# Patient Record
Sex: FEMALE | Employment: UNEMPLOYED | URBAN - METROPOLITAN AREA
[De-identification: names, ages, dates, MRNs, and addresses within clinical notes are randomized per-mention and may not be internally consistent; named-entity substitution may affect disease eponyms.]

---

## 2020-08-31 ENCOUNTER — INITIAL CONSULT (OUTPATIENT)
Dept: SURGERY | Age: 55
End: 2020-08-31
Payer: MEDICAID

## 2020-08-31 VITALS
WEIGHT: 143 LBS | HEIGHT: 59 IN | BODY MASS INDEX: 28.83 KG/M2 | DIASTOLIC BLOOD PRESSURE: 76 MMHG | SYSTOLIC BLOOD PRESSURE: 112 MMHG

## 2020-08-31 PROCEDURE — 99203 OFFICE O/P NEW LOW 30 MIN: CPT | Performed by: STUDENT IN AN ORGANIZED HEALTH CARE EDUCATION/TRAINING PROGRAM

## 2020-08-31 RX ORDER — M-VIT,TX,IRON,MINS/CALC/FOLIC 27MG-0.4MG
1 TABLET ORAL DAILY
COMMUNITY

## 2020-08-31 ASSESSMENT — ENCOUNTER SYMPTOMS
NAUSEA: 0
ABDOMINAL DISTENTION: 0
VOMITING: 0
SHORTNESS OF BREATH: 0
EYES NEGATIVE: 1
CHEST TIGHTNESS: 0
BACK PAIN: 0
ABDOMINAL PAIN: 0
COUGH: 0

## 2020-08-31 NOTE — PROGRESS NOTES
[facility] ([facility]) Vascular and Endovascular Surgery  Luis Parrish DO, RPVI      Chief Complaint:   Chief Complaint   Patient presents with   [facility] Surgical Consult     referred by dr. Ora Cha for varicose veins, having issues with her legs, normally on her feet about 10 hours a day. mother has issues with varicose veins. also has issues with the tendons and is having surgery on her right hand sept 2nd      : #[id number]    HPI  Renny Pinto is a 63 y.o. female who is being seen for bilateral lower extremity swelling. The patient states that she works in a [employer] and has significant swelling at the end of the day. She is Pakistani-speaking only. She states that she has noticed some increased varicose veins particularly in her left leg along the left medial aspect of her calf. She does not really endorse heaviness or ache but she does have some pain after the end of a long day. She denies any wounds or tissue loss. She is a non-smoker. She does not describe any symptoms that would be suggestive of arterial insufficiency. He does state that she has a family history of varicose veins including her mother who had have a procedure which she does not recall the name to treat her varicose veins. In terms of her discomfort her left leg is worse than her right side. She has not tried compression at this time yet. She is open to any and all therapies as necessary. PMH  No past medical history on file. PSH  No past surgical history on file. Med  Current Outpatient Medications   Medication Sig Dispense Refill    Multiple Vitamins-Minerals (THERAPEUTIC MULTIVITAMIN-MINERALS) tablet Take 1 tablet by mouth daily       No current facility-administered medications for this visit.         Allergies  No Known Allergies    Social History  Social History     Socioeconomic History    Marital status: Unknown     Spouse name: None    Number of children: None    Years of education: None    Highest education level: Pupils are equal, round, and reactive to light. Neck:      Musculoskeletal: Normal range of motion. Cardiovascular:      Rate and Rhythm: Normal rate and regular rhythm. Pulmonary:      Effort: Pulmonary effort is normal. No respiratory distress. Breath sounds: No wheezing. Abdominal:      General: There is no distension. Tenderness: There is no abdominal tenderness. Musculoskeletal: Normal range of motion. General: No swelling. Right lower leg: Edema (1+) present. Left lower leg: Edema (1+) present. Skin:     General: Skin is warm and dry. Capillary Refill: Capillary refill takes less than 2 seconds. Comments: Varicosities prominent over distal, medial thigh     Neurological:      General: No focal deficit present. Mental Status: She is alert. Cranial Nerves: No cranial nerve deficit. Psychiatric:         Mood and Affect: Mood normal.         Behavior: Behavior normal.         Thought Content: Thought content normal.         Judgment: Judgment normal.        Vascular: 2+ radial, 2+ dorsalis pedis, 2+ posterial tibial and 2+ femoral    Vitals  Vitals:    08/31/20 1415   BP: 112/76   Site: Left Upper Arm   Position: Sitting   Cuff Size: Medium Adult   Weight: 143 lb (64.9 kg)   Height: 4' 11.06\" (1.5 m)       Imaging  None    Assessment  1. Venous insufficiency of both lower extremities  Bilateral reflux testing  Compression stockings      Reinaldo Oneal is a 63 y.o. female who is being seen for bilateral lower extremity swelling. The patient has a job that is consistent with significant venous insufficiency. She does stand all day. She endorses a family history of venous insufficiency requiring intervention. She has obvious varicose veins particularly on her left medial inner thigh and calf. She has evidence of reticular veins and pledge ectasias over both lower extremities.   Her swelling at this time is mild at best.  However it seems to get worse after standing for long periods of time. As stated above she does work in a [employer] which is consistent with possibly having significant dependent edema. Given that she has not yet tried any therapeutic modalities I will recommend initiation with compression. I do think it is necessary to evaluate her from a reflux standpoint. If she does have significant superficial venous reflux it is possible that ablation may benefit her greatly. However if she also does have significant deep axial reflux particular on the left side then we may be dealing with a may Thurner phenomenon as well. Be important to obtain this information to guide us down over our therapeutic pathway. However once again I am inclined to treat her conservatively with compression stockings. I have given her prescription and will start with 15 to 20 mmHg compression.     Archana Sexton DO, [address] [facility] Vascular and Endovascular Surgery

## 2020-09-03 ENCOUNTER — PROCEDURE VISIT (OUTPATIENT)
Dept: SURGERY | Age: 55
End: 2020-09-03
Payer: MEDICAID

## 2020-09-10 ENCOUNTER — OFFICE VISIT (OUTPATIENT)
Dept: SURGERY | Age: 55
End: 2020-09-10
Payer: MEDICAID

## 2020-09-10 ENCOUNTER — HOSPITAL ENCOUNTER (OUTPATIENT)
Age: 55
Discharge: HOME | End: 2020-09-10
Payer: MEDICAID

## 2020-09-10 ENCOUNTER — HOSPITAL ENCOUNTER (OUTPATIENT)
Dept: GENERAL RADIOLOGY | Age: 55
Discharge: HOME | End: 2020-09-10
Payer: MEDICAID

## 2020-09-10 VITALS
HEIGHT: 59 IN | WEIGHT: 143 LBS | SYSTOLIC BLOOD PRESSURE: 106 MMHG | DIASTOLIC BLOOD PRESSURE: 69 MMHG | BODY MASS INDEX: 28.83 KG/M2

## 2020-09-10 PROCEDURE — 71045 X-RAY EXAM CHEST 1 VIEW: CPT

## 2020-09-10 PROCEDURE — 93005 ELECTROCARDIOGRAM TRACING: CPT

## 2020-09-10 PROCEDURE — 99213 OFFICE O/P EST LOW 20 MIN: CPT | Performed by: STUDENT IN AN ORGANIZED HEALTH CARE EDUCATION/TRAINING PROGRAM

## 2020-09-10 NOTE — PROGRESS NOTES
[facility] ([facility]) Vascular and Endovascular Surgery  Moise Mitchell DO, [address]      Chief Complaint:   Chief Complaint   Patient presents with    Follow-up     go over scan . possible surgery      : [id number]    HPI  Deuce Pemberton is a 63 y.o. female who is being seen for follow-up to discuss venous reflux testing. The patient does have evidence of axial vein reflux in the percepts vein. She has no evidence of deep venous reflux. She does have varicosities that are likely associated with the superficial reflux. Given the degree of the degree of her discomfort and the fact that she stands on her legs all day to work I believe it is reasonable to going to treat this patient. We discussed all aspects of the procedure and its associated complications. She had no further questions. Her discomfort has not improved since last visit. PMH  History reviewed. No pertinent past medical history. [address] [place]  History reviewed. No pertinent surgical history. Med  Current Outpatient Medications   Medication Sig Dispense Refill    Multiple Vitamins-Minerals (THERAPEUTIC MULTIVITAMIN-MINERALS) tablet Take 1 tablet by mouth daily       No current facility-administered medications for this visit.         Allergies  No Known Allergies    Social History  Social History     Socioeconomic History    Marital status: Unknown     Spouse name: None    Number of children: None    Years of education: None    Highest education level: None   Occupational History    None   Social Needs    Financial resource strain: None    Food insecurity     Worry: None     Inability: None    Transportation needs     Medical: None     Non-medical: None   Tobacco Use    Smoking status: Never Smoker    Smokeless tobacco: Never Used   Substance and Sexual Activity    Alcohol use: None    Drug use: None    Sexual activity: None   Lifestyle    Physical activity     Days per week: None     Minutes per session: None    Stress: None Relationships    Social connections     Talks on phone: None     Gets together: None     Attends Jehovah's witness service: None     Active member of club or organization: None     Attends meetings of clubs or organizations: None     Relationship status: None    Intimate partner violence     Fear of current or ex partner: None     Emotionally abused: None     Physically abused: None     Forced sexual activity: None   Other Topics Concern    None   Social History Narrative    None       Family History  History reviewed. No pertinent family history. ROS  Review of Systems   Constitutional: Negative for activity change, chills, diaphoresis and fatigue. HENT: Negative for congestion. Eyes: Negative. Respiratory: Negative for cough, chest tightness and shortness of breath. Cardiovascular: Positive for leg swelling. Negative for chest pain and palpitations. Gastrointestinal: Negative for abdominal distention, abdominal pain, nausea and vomiting. Endocrine: Negative for cold intolerance and heat intolerance. Musculoskeletal: Negative for arthralgias, back pain and gait problem. Neurological: Negative for dizziness, weakness and numbness. Hematological: Does not bruise/bleed easily. Psychiatric/Behavioral: Negative for agitation and behavioral problems.         Physical Exam  Physical Exam  Constitutional:       Appearance: Normal appearance. HENT:      Head: Normocephalic and atraumatic. Nose: Nose normal.   Eyes:      Pupils: Pupils are equal, round, and reactive to light. Neck:      Musculoskeletal: Normal range of motion. Cardiovascular:      Rate and Rhythm: Normal rate and regular rhythm. Pulmonary:      Effort: Pulmonary effort is normal. No respiratory distress. Breath sounds: No wheezing. Abdominal:      General: There is no distension. Tenderness: There is no abdominal tenderness. Musculoskeletal: Normal range of motion. General: No swelling.       Right here.    Mercedes Lentz DO, [address] [facility] Vascular and Endovascular Surgery

## 2020-09-11 LAB
EKG ATRIAL RATE: 59 BPM
EKG DIAGNOSIS: NORMAL
EKG P AXIS: 77 DEGREES
EKG P-R INTERVAL: 160 MS
EKG Q-T INTERVAL: 414 MS
EKG QRS DURATION: 84 MS
EKG QTC CALCULATION (BAZETT): 409 MS
EKG R AXIS: 81 DEGREES
EKG T AXIS: 57 DEGREES
EKG VENTRICULAR RATE: 59 BPM

## 2020-09-11 PROCEDURE — 93010 ELECTROCARDIOGRAM REPORT: CPT | Performed by: INTERNAL MEDICINE

## 2020-09-21 ENCOUNTER — TELEPHONE (OUTPATIENT)
Dept: SURGERY | Age: 55
End: 2020-09-21

## 2020-09-21 NOTE — TELEPHONE ENCOUNTER
Spoke with patient via a [address] #[id number]. Spoke with patient regarding scheduled surgery on 9/25/2020 with Dr Jami Kearney. Patient is asked to arrive by 7:30 AM @ [address] after midnight. You will need someone to drive you home following this procedure. Please bring a Photo ID & Insurance card with you, and check-in at the Surgery Desk down the right-hand hallway on the first floor. Surgery is scheduled to start at approx. 8:55 AM and should take approx. 90 minutes. If the hospital needs any further information, someone will give you a call. Patient expressed a verbal understanding of these instructions and had no further questions at this time. Call ended.

## 2020-09-23 NOTE — PROGRESS NOTES
C-Difficile admission screening and protocol:     * Admitted with diarrhea?n     *Prior history of C-Diff. In last 3 months?n     *Antibiotic use in the past 6-8 weeks?n     *Prior hospitalization or nursing home in the last month? n              [address]  time_____730 per interpreter_______        Surgery time____________    Take the following medications with a sip of water:    Do not eat or drink anything after 12:00 midnight prior to your surgery. This includes water chewing gum, mints and ice chips. You may brush your teeth and gargle the morning of your surgery, but do not swallow the water     Please see your family doctor/pediatrician for a history and physical and/or concerning medications. Bring any test results/reports from your physicians office. If you are under the care of a heart doctor or specialist doctor, please be aware that you may be asked to them for clearance    You may be asked to stop blood thinners such as Coumadin, Plavix, Fragmin, Lovenox, etc., or any anti-inflammatories such as:  Aspirin, Ibuprofen, Advil, Naproxen prior to your surgery. We also ask that you stop any OTC medications such as fish oil, vitamin E, glucosamine, garlic, Multivitamins, COQ 10, etc.    We ask that you do not smoke 24 hours prior to surgery  We ask that you do not  drink any alcoholic beverages 24 hours prior to surgery     You must make arrangements for a responsible adult to take you home after your surgery. For your safety you will not be allowed to leave alone or drive yourself home. Your surgery will be cancelled if you do not have a ride home. Also for your safety, it is strongly suggested that someone stay with you the first 24 hours after your surgery. A parent or legal guardian must accompany a child scheduled for surgery and plan to stay at the hospital until the child is discharged.     Please do not bring other children with you.    For your comfort, please wear simple loose fitting clothing to the hospital.  Please do not bring valuables. Do not wear any make-up or nail polish on your fingers or toes      For your safety, please do not wear any jewelry or body piercing's on the day of surgery. All jewelry must be removed. If you have dentures, they will be removed before going to operating room. For your convenience, we will provide you with a container. If you wear contact lenses or glasses, they will be removed, please bring a case for them. If you have a living will and a durable power of  for healthcare, please bring in a copy. As part of our patient safety program to minimize surgical site infections, we ask you to do the following:    · Please notify your surgeon if you develop any illness between         now and the  day of your surgery. · This includes a cough, cold, fever, sore throat, nausea,         or vomiting, and diarrhea, etc.  ·  Please notify your surgeon if you experience dizziness, shortness         of breath or blurred vision between now and the time of your surgery. Do not shave your operative site 96 hours prior to surgery. For face and neck surgery, men may use an electric razor 48 hours   prior to surgery. You may shower the night before surgery or the morning of   your surgery with an antibacterial soap. You will need to bring a photo ID and insurance card    [facility] has an onsite pharmacy, would you like to utilize our pharmacy     If you will be staying overnight and use a C-pap machine, please bring   your C-pap to hospital     Our goal is to provide you with excellent care, therefore, visitors will be limited to two(2) in the room at a time so that we may focus on providing this care for you. Please contact pre-admission testing if you have any further questions.                  [facility] phone number:  [phone] PAT fax number: [phone]  Please note these are generalized instructions for all surgical cases, you may be provided with more specific instructions according to your surgery.

## 2020-09-23 NOTE — PROGRESS NOTES
Preoperative Screening for Elective Surgery/Invasive Procedures While COVID-19 present in the community     Have you tested positive or have been told to self-isolate for COVID-19 like symptoms within the past 28 days? n   Do you currently have any of the following symptoms? o Fever >100.0 F or 99.9 F in immunocompromised patients?n  o New onset cough, shortness of breath or difficulty breathing?n  o New onset sore throat, myalgia (muscle aches and pains), headache, loss of taste/smell or diarrhea?n   Have you had a potential exposure to COVID-19 within the past 14 days by:  o Close contact with a confirmed case?n  o Close contact with a healthcare worker,  or essential infrastructure worker (grocery store, [employer], gas station, public utilities or transportation)? YES  o Do you reside in a congregate setting such as; skilled nursing facility, adult home, correctional facility, homeless shelter or other institutional setting? no  o Have you had recent travel to a known COVID-19 hotspot? pt resides in [facility] if the patient has a positive screen by answering yes to one or more of the above questions. Patients who test positive or screen positive prior to surgery or on the day of surgery should be evaluated in conjunction with the surgeon/proceduralist/anesthesiologist to determine the urgency of the procedure.

## 2020-09-23 NOTE — PROGRESS NOTES
Pat interview complete    Pt instructed to bring in medications, DOS    Pt instructed to call Office re: covid test and I also left a message with Corrinne Bloomer and Cassy Blanca.     Chart given to Corrine Pederson as a follow up

## 2020-09-24 ENCOUNTER — ANESTHESIA EVENT (OUTPATIENT)
Dept: OPERATING ROOM | Age: 55
End: 2020-09-24
Payer: MEDICAID

## 2020-09-25 ENCOUNTER — ANESTHESIA (OUTPATIENT)
Dept: OPERATING ROOM | Age: 55
End: 2020-09-25
Payer: MEDICAID

## 2020-09-25 ENCOUNTER — HOSPITAL ENCOUNTER (OUTPATIENT)
Age: 55
Setting detail: OUTPATIENT SURGERY
Discharge: HOME | End: 2020-09-25
Attending: STUDENT IN AN ORGANIZED HEALTH CARE EDUCATION/TRAINING PROGRAM | Admitting: STUDENT IN AN ORGANIZED HEALTH CARE EDUCATION/TRAINING PROGRAM
Payer: MEDICAID

## 2020-09-25 VITALS
RESPIRATION RATE: 16 BRPM | BODY MASS INDEX: 28.91 KG/M2 | TEMPERATURE: 97 F | HEIGHT: 59 IN | DIASTOLIC BLOOD PRESSURE: 65 MMHG | HEART RATE: 63 BPM | OXYGEN SATURATION: 100 % | WEIGHT: 143.41 LBS | SYSTOLIC BLOOD PRESSURE: 108 MMHG

## 2020-09-25 VITALS
SYSTOLIC BLOOD PRESSURE: 127 MMHG | DIASTOLIC BLOOD PRESSURE: 81 MMHG | OXYGEN SATURATION: 100 % | RESPIRATION RATE: 7 BRPM

## 2020-09-25 LAB — SARS-COV-2, NAAT: NOT DETECTED

## 2020-09-25 PROCEDURE — 2720000010 HC SURG SUPPLY STERILE: Performed by: STUDENT IN AN ORGANIZED HEALTH CARE EDUCATION/TRAINING PROGRAM

## 2020-09-25 PROCEDURE — 2580000003 HC RX 258: Performed by: ANESTHESIOLOGY

## 2020-09-25 PROCEDURE — 6370000000 HC RX 637 (ALT 250 FOR IP): Performed by: ANESTHESIOLOGY

## 2020-09-25 PROCEDURE — 7100000011 HC PHASE II RECOVERY - ADDTL 15 MIN: Performed by: STUDENT IN AN ORGANIZED HEALTH CARE EDUCATION/TRAINING PROGRAM

## 2020-09-25 PROCEDURE — C1769 GUIDE WIRE: HCPCS | Performed by: STUDENT IN AN ORGANIZED HEALTH CARE EDUCATION/TRAINING PROGRAM

## 2020-09-25 PROCEDURE — 36475 ENDOVENOUS RF 1ST VEIN: CPT | Performed by: STUDENT IN AN ORGANIZED HEALTH CARE EDUCATION/TRAINING PROGRAM

## 2020-09-25 PROCEDURE — 6360000002 HC RX W HCPCS

## 2020-09-25 PROCEDURE — C1894 INTRO/SHEATH, NON-LASER: HCPCS | Performed by: STUDENT IN AN ORGANIZED HEALTH CARE EDUCATION/TRAINING PROGRAM

## 2020-09-25 PROCEDURE — 3600000005 HC SURGERY LEVEL 5 BASE: Performed by: STUDENT IN AN ORGANIZED HEALTH CARE EDUCATION/TRAINING PROGRAM

## 2020-09-25 PROCEDURE — 7100000010 HC PHASE II RECOVERY - FIRST 15 MIN: Performed by: STUDENT IN AN ORGANIZED HEALTH CARE EDUCATION/TRAINING PROGRAM

## 2020-09-25 PROCEDURE — 2500000003 HC RX 250 WO HCPCS: Performed by: STUDENT IN AN ORGANIZED HEALTH CARE EDUCATION/TRAINING PROGRAM

## 2020-09-25 PROCEDURE — 3700000000 HC ANESTHESIA ATTENDED CARE: Performed by: STUDENT IN AN ORGANIZED HEALTH CARE EDUCATION/TRAINING PROGRAM

## 2020-09-25 PROCEDURE — 3700000001 HC ADD 15 MINUTES (ANESTHESIA): Performed by: STUDENT IN AN ORGANIZED HEALTH CARE EDUCATION/TRAINING PROGRAM

## 2020-09-25 PROCEDURE — 2580000003 HC RX 258: Performed by: STUDENT IN AN ORGANIZED HEALTH CARE EDUCATION/TRAINING PROGRAM

## 2020-09-25 PROCEDURE — 2500000003 HC RX 250 WO HCPCS

## 2020-09-25 PROCEDURE — 7100000001 HC PACU RECOVERY - ADDTL 15 MIN: Performed by: STUDENT IN AN ORGANIZED HEALTH CARE EDUCATION/TRAINING PROGRAM

## 2020-09-25 PROCEDURE — 2709999900 HC NON-CHARGEABLE SUPPLY: Performed by: STUDENT IN AN ORGANIZED HEALTH CARE EDUCATION/TRAINING PROGRAM

## 2020-09-25 PROCEDURE — 7100000000 HC PACU RECOVERY - FIRST 15 MIN: Performed by: STUDENT IN AN ORGANIZED HEALTH CARE EDUCATION/TRAINING PROGRAM

## 2020-09-25 PROCEDURE — 3600000015 HC SURGERY LEVEL 5 ADDTL 15MIN: Performed by: STUDENT IN AN ORGANIZED HEALTH CARE EDUCATION/TRAINING PROGRAM

## 2020-09-25 PROCEDURE — 37799 UNLISTED PX VASCULAR SURGERY: CPT | Performed by: STUDENT IN AN ORGANIZED HEALTH CARE EDUCATION/TRAINING PROGRAM

## 2020-09-25 PROCEDURE — U0002 COVID-19 LAB TEST NON-CDC: HCPCS

## 2020-09-25 RX ORDER — ONDANSETRON 2 MG/ML
INJECTION INTRAMUSCULAR; INTRAVENOUS PRN
Status: DISCONTINUED | OUTPATIENT
Start: 2020-09-25 | End: 2020-09-25 | Stop reason: SDUPTHER

## 2020-09-25 RX ORDER — LABETALOL HYDROCHLORIDE 5 MG/ML
5 INJECTION, SOLUTION INTRAVENOUS EVERY 10 MIN PRN
Status: DISCONTINUED | OUTPATIENT
Start: 2020-09-25 | End: 2020-09-25 | Stop reason: HOSPADM

## 2020-09-25 RX ORDER — MIDAZOLAM HYDROCHLORIDE 1 MG/ML
INJECTION INTRAMUSCULAR; INTRAVENOUS PRN
Status: DISCONTINUED | OUTPATIENT
Start: 2020-09-25 | End: 2020-09-25 | Stop reason: SDUPTHER

## 2020-09-25 RX ORDER — PROMETHAZINE HYDROCHLORIDE 25 MG/ML
6.25 INJECTION, SOLUTION INTRAMUSCULAR; INTRAVENOUS
Status: DISCONTINUED | OUTPATIENT
Start: 2020-09-25 | End: 2020-09-25 | Stop reason: HOSPADM

## 2020-09-25 RX ORDER — OXYCODONE HYDROCHLORIDE AND ACETAMINOPHEN 5; 325 MG/1; MG/1
1 TABLET ORAL
Status: COMPLETED | OUTPATIENT
Start: 2020-09-25 | End: 2020-09-25

## 2020-09-25 RX ORDER — SODIUM CHLORIDE 9 MG/ML
INJECTION, SOLUTION INTRAVENOUS CONTINUOUS
Status: DISCONTINUED | OUTPATIENT
Start: 2020-09-25 | End: 2020-09-25 | Stop reason: HOSPADM

## 2020-09-25 RX ORDER — GLYCOPYRROLATE 0.2 MG/ML
INJECTION INTRAMUSCULAR; INTRAVENOUS PRN
Status: DISCONTINUED | OUTPATIENT
Start: 2020-09-25 | End: 2020-09-25 | Stop reason: SDUPTHER

## 2020-09-25 RX ORDER — PROPOFOL 10 MG/ML
INJECTION, EMULSION INTRAVENOUS PRN
Status: DISCONTINUED | OUTPATIENT
Start: 2020-09-25 | End: 2020-09-25 | Stop reason: SDUPTHER

## 2020-09-25 RX ORDER — LIDOCAINE HYDROCHLORIDE 20 MG/ML
INJECTION, SOLUTION EPIDURAL; INFILTRATION; INTRACAUDAL; PERINEURAL PRN
Status: DISCONTINUED | OUTPATIENT
Start: 2020-09-25 | End: 2020-09-25 | Stop reason: SDUPTHER

## 2020-09-25 RX ORDER — FENTANYL CITRATE 50 UG/ML
INJECTION, SOLUTION INTRAMUSCULAR; INTRAVENOUS PRN
Status: DISCONTINUED | OUTPATIENT
Start: 2020-09-25 | End: 2020-09-25 | Stop reason: SDUPTHER

## 2020-09-25 RX ORDER — OXYCODONE HYDROCHLORIDE AND ACETAMINOPHEN 5; 325 MG/1; MG/1
2 TABLET ORAL
Status: COMPLETED | OUTPATIENT
Start: 2020-09-25 | End: 2020-09-25

## 2020-09-25 RX ORDER — FENTANYL CITRATE 50 UG/ML
25 INJECTION, SOLUTION INTRAMUSCULAR; INTRAVENOUS EVERY 5 MIN PRN
Status: DISCONTINUED | OUTPATIENT
Start: 2020-09-25 | End: 2020-09-25 | Stop reason: HOSPADM

## 2020-09-25 RX ORDER — SODIUM CHLORIDE 0.9 % (FLUSH) 0.9 %
10 SYRINGE (ML) INJECTION PRN
Status: DISCONTINUED | OUTPATIENT
Start: 2020-09-25 | End: 2020-09-25 | Stop reason: HOSPADM

## 2020-09-25 RX ORDER — DEXAMETHASONE SODIUM PHOSPHATE 4 MG/ML
INJECTION, SOLUTION INTRA-ARTICULAR; INTRALESIONAL; INTRAMUSCULAR; INTRAVENOUS; SOFT TISSUE PRN
Status: DISCONTINUED | OUTPATIENT
Start: 2020-09-25 | End: 2020-09-25 | Stop reason: SDUPTHER

## 2020-09-25 RX ORDER — OXYCODONE HYDROCHLORIDE 5 MG/1
5 TABLET ORAL EVERY 6 HOURS PRN
Qty: 20 TABLET | Refills: 0 | Status: SHIPPED | OUTPATIENT
Start: 2020-09-25 | End: 2020-09-25 | Stop reason: SDUPTHER

## 2020-09-25 RX ORDER — SODIUM CHLORIDE 0.9 % (FLUSH) 0.9 %
10 SYRINGE (ML) INJECTION EVERY 12 HOURS SCHEDULED
Status: DISCONTINUED | OUTPATIENT
Start: 2020-09-25 | End: 2020-09-25 | Stop reason: HOSPADM

## 2020-09-25 RX ORDER — MAGNESIUM HYDROXIDE 1200 MG/15ML
LIQUID ORAL CONTINUOUS PRN
Status: COMPLETED | OUTPATIENT
Start: 2020-09-25 | End: 2020-09-25

## 2020-09-25 RX ORDER — OXYCODONE HYDROCHLORIDE 5 MG/1
5 TABLET ORAL EVERY 6 HOURS PRN
Qty: 20 TABLET | Refills: 0 | Status: SHIPPED | OUTPATIENT
Start: 2020-09-25 | End: 2020-09-30

## 2020-09-25 RX ADMIN — DEXAMETHASONE SODIUM PHOSPHATE 8 MG: 4 INJECTION, SOLUTION INTRAMUSCULAR; INTRAVENOUS at 08:56

## 2020-09-25 RX ADMIN — OXYCODONE HYDROCHLORIDE AND ACETAMINOPHEN 1 TABLET: 5; 325 TABLET ORAL at 11:27

## 2020-09-25 RX ADMIN — GLYCOPYRROLATE 0.1 MG: 0.2 INJECTION, SOLUTION INTRAMUSCULAR; INTRAVENOUS at 08:47

## 2020-09-25 RX ADMIN — LIDOCAINE HYDROCHLORIDE 60 MG: 20 INJECTION, SOLUTION EPIDURAL; INFILTRATION; INTRACAUDAL; PERINEURAL at 08:52

## 2020-09-25 RX ADMIN — FENTANYL CITRATE 50 MCG: 50 INJECTION INTRAMUSCULAR; INTRAVENOUS at 09:34

## 2020-09-25 RX ADMIN — SODIUM CHLORIDE: 9 INJECTION, SOLUTION INTRAVENOUS at 08:42

## 2020-09-25 RX ADMIN — ONDANSETRON 4 MG: 2 INJECTION INTRAMUSCULAR; INTRAVENOUS at 09:37

## 2020-09-25 RX ADMIN — FENTANYL CITRATE 25 MCG: 50 INJECTION INTRAMUSCULAR; INTRAVENOUS at 08:52

## 2020-09-25 RX ADMIN — FENTANYL CITRATE 25 MCG: 50 INJECTION INTRAMUSCULAR; INTRAVENOUS at 08:56

## 2020-09-25 RX ADMIN — PROPOFOL 160 MG: 10 INJECTION, EMULSION INTRAVENOUS at 08:52

## 2020-09-25 RX ADMIN — MIDAZOLAM 2 MG: 1 INJECTION INTRAMUSCULAR; INTRAVENOUS at 08:47

## 2020-09-25 ASSESSMENT — PULMONARY FUNCTION TESTS
PIF_VALUE: 2
PIF_VALUE: 1
PIF_VALUE: 1
PIF_VALUE: 2
PIF_VALUE: 2
PIF_VALUE: 1
PIF_VALUE: 2
PIF_VALUE: 1
PIF_VALUE: 2
PIF_VALUE: 3
PIF_VALUE: 2
PIF_VALUE: 3
PIF_VALUE: 2
PIF_VALUE: 1
PIF_VALUE: 2
PIF_VALUE: 4
PIF_VALUE: 2
PIF_VALUE: 3
PIF_VALUE: 2
PIF_VALUE: 3
PIF_VALUE: 2
PIF_VALUE: 12
PIF_VALUE: 0
PIF_VALUE: 2
PIF_VALUE: 1
PIF_VALUE: 4

## 2020-09-25 ASSESSMENT — PAIN DESCRIPTION - ONSET
ONSET: ON-GOING

## 2020-09-25 ASSESSMENT — PAIN DESCRIPTION - PAIN TYPE
TYPE: SURGICAL PAIN

## 2020-09-25 ASSESSMENT — PAIN DESCRIPTION - LOCATION
LOCATION: LEG

## 2020-09-25 ASSESSMENT — PAIN DESCRIPTION - ORIENTATION
ORIENTATION: LEFT

## 2020-09-25 ASSESSMENT — PAIN DESCRIPTION - DESCRIPTORS
DESCRIPTORS: SORE
DESCRIPTORS: DISCOMFORT

## 2020-09-25 ASSESSMENT — PAIN - FUNCTIONAL ASSESSMENT
PAIN_FUNCTIONAL_ASSESSMENT: ACTIVITIES ARE NOT PREVENTED
PAIN_FUNCTIONAL_ASSESSMENT: ACTIVITIES ARE NOT PREVENTED
PAIN_FUNCTIONAL_ASSESSMENT: 0-10
PAIN_FUNCTIONAL_ASSESSMENT: ACTIVITIES ARE NOT PREVENTED

## 2020-09-25 ASSESSMENT — PAIN SCALES - GENERAL
PAINLEVEL_OUTOF10: 6
PAINLEVEL_OUTOF10: 6
PAINLEVEL_OUTOF10: 5
PAINLEVEL_OUTOF10: 0
PAINLEVEL_OUTOF10: 2

## 2020-09-25 ASSESSMENT — PAIN DESCRIPTION - PROGRESSION
CLINICAL_PROGRESSION: GRADUALLY IMPROVING
CLINICAL_PROGRESSION: GRADUALLY IMPROVING
CLINICAL_PROGRESSION: GRADUALLY WORSENING

## 2020-09-25 ASSESSMENT — PAIN DESCRIPTION - FREQUENCY
FREQUENCY: CONTINUOUS

## 2020-09-25 NOTE — PROGRESS NOTES
Alert and oriented. Analgesic given for surgical pain. Tolerated sitting up and po fluids and crackers well. Sister and  at bedside. Verbalized understanding of discharge instructions. Dressing remains clean dry intact. Toes are warm, move well, landon well.

## 2020-09-25 NOTE — ANESTHESIA POSTPROCEDURE EVALUATION
[facility] Department of Anesthesiology  Post-Anesthesia Note       Name:  Idania De La Fuente                                  Age:  63 y.o. MRN:  [id number]     Last Vitals & Oxygen Saturation: /71   Pulse 53   Temp 97 °F (36.1 °C)   Resp 12   Ht 4' 11\" (1.499 m)   Wt 143 lb 6.6 oz (65.1 kg)   LMP 09/23/2018 (Approximate)   SpO2 100%   BMI 28.97 kg/m²   Patient Vitals for the past 4 hrs:   BP Temp Temp src Pulse Resp SpO2 Height Weight   09/25/20 1044 -- -- -- 53 12 100 % -- --   09/25/20 1034 110/71 -- -- 54 10 100 % -- --   09/25/20 1029 -- 97 °F (36.1 °C) -- -- -- -- -- --   09/25/20 1022 -- -- -- 60 -- -- -- --   09/25/20 1017 103/60 -- -- -- -- (!) 86 % -- --   09/25/20 1006 118/66 -- -- 66 9 -- -- --   09/25/20 1004 -- -- -- 90 10 98 % -- --   09/25/20 1003 -- -- -- 75 10 100 % -- --   09/25/20 1002 -- -- -- 75 9 100 % -- --   09/25/20 1001 117/86 -- -- 71 8 100 % -- --   09/25/20 1000 117/86 97 °F (36.1 °C) Temporal 71 10 100 % -- --   09/25/20 0756 112/70 98 °F (36.7 °C) -- 62 16 99 % 4' 11\" (1.499 m) 143 lb 6.6 oz (65.1 kg)       Level of consciousness:  Awake, alert    Respiratory: Respirations easy, no distress. Stable. Cardiovascular: Hemodynamically stable. Hydration: Adequate. PONV: Adequately managed. Post-op pain: Adequately controlled. Post-op assessment: Tolerated anesthetic well without complication. Complications:  None.     Philly Manning MD  September 25, 2020   11:12 AM

## 2020-09-25 NOTE — H&P
[facility]) Vascular and Endovascular Surgery  Jermaine Oconnor DO, RPVI      Chief Complaint:   No chief complaint on file. : [id number]    HPI  Pamella Gosselin is a 63 y.o. female who is being seen for follow-up to discuss venous reflux testing. The patient does have evidence of axial vein reflux in the percepts vein. She has no evidence of deep venous reflux. She does have varicosities that are likely associated with the superficial reflux. Given the degree of the degree of her discomfort and the fact that she stands on her legs all day to work I believe it is reasonable to going to treat this patient. We discussed all aspects of the procedure and its associated complications. She had no further questions. Her discomfort has not improved since last visit.     PMH  Past Medical History:   Diagnosis Date    Gallbladder attack     Prolonged emergence from general anesthesia     with her cesarian sections-pt states via        [address]    Past Surgical History:   Procedure Laterality Date    BREAST BIOPSY      right     SECTION      2x    CHOLECYSTECTOMY      GASTRIC BYPASS SURGERY         Med  Current Facility-Administered Medications   Medication Dose Route Frequency Provider Last Rate Last Dose    0.9 % sodium chloride infusion   Intravenous Continuous Esther Lora MD        sodium chloride flush 0.9 % injection 10 mL  10 mL Intravenous 2 times per day Esther Lora MD        sodium chloride flush 0.9 % injection 10 mL  10 mL Intravenous PRN Esther Lora MD        fentaNYL (SUBLIMAZE) injection 25 mcg  25 mcg Intravenous Q5 Min PRN Sukhjinder Miles MD        HYDROmorphone (DILAUDID) injection 0.5 mg  0.5 mg Intravenous Q5 Min PRN Sukhjinder Miles MD        promethazine [facility]) injection 6.25 mg  6.25 mg Intravenous Once PRN Sukhjinder Miles MD        labetalol (NORMODYNE;TRANDATE) injection 5 mg  5 mg Intravenous Q10 Min PRN Sukhjinder Miles MD        ceFAZolin (ANCEF) 2 g in dextrose 5 % 100 mL IVPB  2 g Intravenous Once Christina Christensen DO           Allergies  No Known Allergies    Social History  Social History     Socioeconomic History    Marital status: Unknown     Spouse name: None    Number of children: None    Years of education: None    Highest education level: None   Occupational History    None   Social Needs    Financial resource strain: None    Food insecurity     Worry: None     Inability: None    Transportation needs     Medical: None     Non-medical: None   Tobacco Use    Smoking status: Never Smoker    Smokeless tobacco: Never Used   Substance and Sexual Activity    Alcohol use: Not Currently    Drug use: Never    Sexual activity: Not Currently   Lifestyle    Physical activity     Days per week: None     Minutes per session: None    Stress: None   Relationships    Social connections     Talks on phone: None     Gets together: None     Attends Mosque service: None     Active member of club or organization: None     Attends meetings of clubs or organizations: None     Relationship status: None    Intimate partner violence     Fear of current or ex partner: None     Emotionally abused: None     Physically abused: None     Forced sexual activity: None   Other Topics Concern    None   Social History Narrative    None       Family History  History reviewed. No pertinent family history. ROS  Review of Systems   Constitutional: Negative for activity change, chills, diaphoresis and fatigue. HENT: Negative for congestion. Eyes: Negative. Respiratory: Negative for cough, chest tightness and shortness of breath. Cardiovascular: Positive for leg swelling. Negative for chest pain and palpitations. Gastrointestinal: Negative for abdominal distention, abdominal pain, nausea and vomiting. Endocrine: Negative for cold intolerance and heat intolerance. Musculoskeletal: Negative for arthralgias, back pain and gait problem.    Neurological: Negative for dizziness, weakness and numbness. Hematological: Does not bruise/bleed easily. Psychiatric/Behavioral: Negative for agitation and behavioral problems.         Physical Exam  Physical Exam  Constitutional:       Appearance: Normal appearance. HENT:      Head: Normocephalic and atraumatic. Nose: Nose normal.   Eyes:      Pupils: Pupils are equal, round, and reactive to light. Neck:      Musculoskeletal: Normal range of motion. Cardiovascular:      Rate and Rhythm: Normal rate and regular rhythm. Pulmonary:      Effort: Pulmonary effort is normal. No respiratory distress. Breath sounds: No wheezing. Abdominal:      General: There is no distension. Tenderness: There is no abdominal tenderness. Musculoskeletal: Normal range of motion. General: No swelling. Right lower leg: Edema (1+) present. Left lower leg: Edema (1+) present. Skin:     General: Skin is warm and dry. Capillary Refill: Capillary refill takes less than 2 seconds. Comments: Varicosities prominent over distal, medial thigh     Neurological:      General: No focal deficit present. Mental Status: She is alert. Cranial Nerves: No cranial nerve deficit. Psychiatric:         Mood and Affect: Mood normal.         Behavior: Behavior normal.         Thought Content: Thought content normal.         Judgment: Judgment normal.         Vascular: 2+ radial, 2+ dorsalis pedis, 2+ posterial tibial and 2+ femoral    Vitals  Vitals:    09/23/20 1712 09/25/20 0756   BP:  112/70   Pulse:  62   Resp:  16   Temp:  98 °F (36.7 °C)   SpO2:  99%   Weight: 143 lb (64.9 kg) 143 lb 6.6 oz (65.1 kg)   Height: 4' 11\" (1.499 m) 4' 11\" (1.499 m)       Imaging  Venous Reflux testing - evidence of >500 ms reflux in left GSV to calf, no overt deep venous reflux    Assessment  1. Pre-op testing  Schedule left GSV ablation with stab phlebectomy  - EKG 12 lead;  Future  - XR CHEST 1 VIEW; Future      Plan  Jacqueline Rosa is a 63 y.o. female who is being seen for left lower extremity swelling, pain, heaviness, varicose veins. The patient has evidence of axial vein reflux in her superficial venous system. She is not overweight. Her symptoms are severe enough that are causing her difficulty with her work. I do believe it is reasonable to go ahead and treat her for her reflux in her left leg. She is no evidence of reflux in her right leg. She does not have any deep venous reflux to suggest me Thurner's as the cause. This is likely more or less a physiologic problem with her superficial venous system. I would therefore recommend undergoing greater saphenous vein ablation with subsequent stab phlebectomies to remove the varicose veins that are somewhat tender to touch. We will order preoperative testing and plan for this procedure to be done in the hospital here.     Roscoe Reynaga DO, [address] [facility] Vascular and Endovascular Surgery    H/P reviewed  No new updates or changes  Okay to proceed to OR    Electronically signed by Roscoe Reynaga DO on 9/25/2020 at 8:19 AM

## 2020-09-25 NOTE — ANESTHESIA PRE PROCEDURE
Blocker         Neuro/Psych:   Negative Neuro/Psych ROS              GI/Hepatic/Renal: Neg GI/Hepatic/Renal ROS            Endo/Other: Negative Endo/Other ROS                    Abdominal:           Vascular: negative vascular ROS. Anesthesia Plan      general     ASA 2     ( used throughout interview. Questions and concerns addressed.)  Induction: intravenous. MIPS: Postoperative opioids intended and Prophylactic antiemetics administered. Anesthetic plan and risks discussed with patient. Plan discussed with CRNA. This pre-anesthesia assessment may be used as a history and physical.    DOS STAFF ADDENDUM:    Pt seen and examined, chart reviewed (including anesthesia, drug and allergy history). No interval changes to history and physical examination. Anesthetic plan, risks, benefits, alternatives, and personnel involved discussed with patient. Questions and concerns addressed. Patient(family) verbalized an understanding and agrees to proceed.       Jaz Bang MD  September 25, 2020  8:10 AM

## 2020-09-26 NOTE — OP NOTE
[address] [facility]                                [facility]    PATIENT NAME: Suma Allen                        :        1965  MED REC NO:   [id number]                          ROOM:  ACCOUNT NO:   [de-identified]                           ADMIT DATE: 2020  PROVIDER:     Jermaine Oconnor DO      DATE OF PROCEDURE:  2020    PREOPERATIVE DIAGNOSIS:  Venous insufficiency of left lower extremity. POSTOPERATIVE DIAGNOSIS:  Venous insufficiency of left lower extremity. OPERATION PERFORMED:  1. Ultrasound-guided access to the left greater saphenous vein. 2.  Greater saphenous vein ablation using a 7-cm ClosureFast  radiofrequency catheter from the saphenofemoral junction, 2 cm from the  junction to the mid thigh. 3.  Eight stab phlebectomies. SURGEON:  Jermaine Oconnor DO    ASSISTANT:  Justo Woodall. ANESTHESIA:  General.    ESTIMATED BLOOD LOSS:  Less than 50 mL. COMPLICATIONS:  None apparent. DRAINS:  None. FINDINGS:  The greater saphenous vein did exit the fascia at the level  of the mid thigh, therefore, the ablation was only carried to this  level. The vein was large but it did exhibit intraluminal debris and  thrombus after the ablation therapy. I then proceeded to do eight stab  phlebectomies for varicosities that were symptomatic in the left lower  extremity. INDICATIONS:  This 24-year-old female patient presented to my office. She is a Maori-speaking individual.  She presented mainly for  discomfort in her left leg compared to her right leg. She states that  she is a  and stands all day. She had used compression  stockings with ill-effect. She had noticeable varicosities that were  tender to palpation over her left medial distal thigh as well as  proximal calf area.   I, therefore, proceeded to undergo a venous  insufficiency study, which demonstrated axial vein reflux in the left  greater saphenous vein. We then discussed the options available to her,  that her swelling and her pain may be related to axial vein reflux. We  then discussed this plan and she agreed to undergo greater saphenous  vein ablation with subsequent stab phlebectomies to relieve the  symptoms. She gave both written and verbal consent for the procedure. OPERATIVE PROCEDURE:  The patient was brought into the operating room,  placed supine on the table. She underwent successful general  anesthesia. Appropriate monitoring lines including continuous blood  pressure, EKG, pulse oximetry were placed on the patient and the patient  received appropriate anesthesia care during the entire case. The left  lower extremity was then prepped and draped in its entirety. The  patient did receive preoperative antibiotics. The leg was prepped and  draped using chlorhexidine solution. This was allowed to dry for a few  minutes. I had previously marked the areas of varicosities over her  medial aspect of her knee and distal thigh and proximal calf. That  would be the areas that would undergo phlebectomies as well. I then proceeded to ultrasound the greater saphenous vein. I  ultrasounded to the level of the saphenofemoral junction as well as the  distal thigh. What I did notice was that it exited the deep fascia at  the level of mid thigh. Given that I was not comfortable with doing the  ablation any further distal than that, I then accessed the vessel around  the knee. I proceeded to insert the 7-[place]an sheath without issue. I  flushed it with adequate return. The sheath was unfortunately lodged in  the bifurcation, therefore I used an 0.025 wire to navigate into the  proximal portion of greater saphenous vein. I then proceeded to pass the catheter up to the level of the  saphenofemoral junction.   I then proceeded to pull back the catheter  roughly 2 cm proximal to the saphenofemoral to evaluate for EHIT.         Vonnie Cason DO    D: 09/25/2020 9:59:44       T: 09/25/2020 12:07:55     [email or web address]  Job#: [id number]     Doc#: [id number]    CC:

## 2020-09-28 ENCOUNTER — TELEPHONE (OUTPATIENT)
Dept: SURGERY | Age: 55
End: 2020-09-28

## 2020-09-28 NOTE — TELEPHONE ENCOUNTER
----- Message from Faby Edwards DO sent at 9/25/2020 10:21 AM EDT -----  Ultrasound Monday or Tuesday, 3 week follow up after that with no testing

## 2020-09-28 NOTE — TELEPHONE ENCOUNTER
----- Message from Luis Parrish, DO sent at 9/25/2020  8:20 AM EDT -----  I forgot to tell you but ablations need a repeat VL duplex venous lower extremity roughly 72 hours after ablation, can you set her up later today for that either Monday or Tuesday, she does not need an office visit at that time? I will order the test. She won't be free until this afternoon as I have yet to do her procedure. LVM for pt.

## 2020-09-29 ENCOUNTER — PROCEDURE VISIT (OUTPATIENT)
Dept: SURGERY | Age: 55
End: 2020-09-29
Payer: MEDICAID

## 2020-09-29 PROCEDURE — 93971 EXTREMITY STUDY: CPT | Performed by: SURGERY

## 2021-01-19 PROCEDURE — 93970 EXTREMITY STUDY: CPT | Performed by: SURGERY

## (undated) DEVICE — SET INTRO SHTH MIC L7CM DIA7FR 0.018IN NDL L2.75IN DIA21GA

## (undated) DEVICE — SHEET,DRAPE,53X77,STERILE: Brand: MEDLINE

## (undated) DEVICE — CATHETER ABLAT 7FR L60CM HEAT ELEMENT L7CM 0.025IN

## (undated) DEVICE — MAJOR SET UP PK

## (undated) DEVICE — SET ADMIN NEEDLESS Y SITE RLER CLMP M SPIN LOK 10 GTT LEN

## (undated) DEVICE — SUTURE PERMAHAND SZ 2-0 L12X18IN NONABSORBABLE BLK SILK A185H

## (undated) DEVICE — GUIDEWIRE VASC L150CM DIA0.025IN PTFE DBL END FIX COR

## (undated) DEVICE — NEEDLE SPNL L3.5IN PNK HUB S STL REG WALL FIT STYL W/ QNCKE

## (undated) DEVICE — NEEDLE HYPO 25GA L1.5IN BVL ORIENTED ECLIPSE

## (undated) DEVICE — TOWEL,OR,DSP,ST,BLUE,STD,4/PK,20PK/CS: Brand: MEDLINE

## (undated) DEVICE — INTRODUCER CATH MIC L45CM DIA4FR TIP L7CM B BVL S STL WIRE

## (undated) DEVICE — SUTURE VCRL SZ 3-0 L27IN ABSRB UD L36MM CT-1 1/2 CIR J258H

## (undated) DEVICE — SUTURE PERMAHAND SZ 2-0 L18IN NONABSORBABLE BLK L26MM SH C012D

## (undated) DEVICE — 3M™ IOBAN™ 2 ANTIMICROBIAL INCISE DRAPE 6650EZ: Brand: IOBAN™ 2

## (undated) DEVICE — GAUZE FLUFF 2 PLY: Brand: DEROYAL

## (undated) DEVICE — APPLIER CLP L9.375IN APER 2.1MM CLS L3.8MM 20 SM TI CLP

## (undated) DEVICE — Device: Brand: MEDCO MANUFACTURING INC

## (undated) DEVICE — SOLUTION IV IRRIG POUR BRL 0.9% SODIUM CHL 2F7124

## (undated) DEVICE — SUTURE PERMA-HAND SZ 2-0 L30IN NONABSORBABLE BLK L26MM SH K833H

## (undated) DEVICE — SYRINGE MED 10ML LUERLOCK TIP W/O SFTY DISP

## (undated) DEVICE — KNIFE OPHTH 25MM 55DEG BVL UP ANG SFTY CATRCT XSTAR

## (undated) DEVICE — BANDAGE,GAUZE,BULKEE II,4.5"X4.1YD,STRL: Brand: MEDLINE

## (undated) DEVICE — Z CONVERTED USE 2271377 BANDAGE COMPR W6INXL5YD EC E REB

## (undated) DEVICE — Device

## (undated) DEVICE — MERCY HEALTH WEST TURNOVER: Brand: MEDLINE INDUSTRIES, INC.

## (undated) DEVICE — CHLORAPREP 26ML ORANGE

## (undated) DEVICE — COVER US PRB W13XL244CM SURGICAL INTRAOPERATIVE W RUBBERBAND

## (undated) DEVICE — SUTURE VCRL SZ 4-0 L27IN ABSRB UD L19MM FS-2 3/8 CIR REV J422H

## (undated) DEVICE — Z DUP USE 2257490 ADHESIVE SKIN CLSRE 036ML TPCL 2CTL CNCRLTE HIGH VSCSTY DRMB

## (undated) DEVICE — BANDAGE COMPR W6INXL10YD ST M E WHITE/BEIGE

## (undated) DEVICE — SUTURE NONABSORBABLE MONOFILAMENT 5-0 C-1 4X36 IN PROLENE M8720

## (undated) DEVICE — INTENDED FOR TISSUE SEPARATION, AND OTHER PROCEDURES THAT REQUIRE A SHARP SURGICAL BLADE TO PUNCTURE OR CUT.: Brand: BARD-PARKER ® STAINLESS STEEL BLADES

## (undated) DEVICE — GLOVE,SURG,SENSICARE SLT,LF,PF,7.5: Brand: MEDLINE

## (undated) DEVICE — Z DISCONTINUED NO SUB IDED PUMP SET INFLTR

## (undated) DEVICE — COVER LT HNDL BLU PLAS

## (undated) DEVICE — APPLIER LIG CLP M L11IN TI STR RNG HNDL FOR 20 CLP DISP

## (undated) DEVICE — ZIMMER® STERILE DISPOSABLE TOURNIQUET CUFF WITH PLC, DUAL PORT, SINGLE BLADDER, 34 IN. (86 CM)

## (undated) DEVICE — ELECTRODE PT RET AD L9FT HI MOIST COND ADH HYDRGEL CORDED

## (undated) DEVICE — SUTURE PERMAHAND SZ 4-0 L18IN NONABSORBABLE BLK SILK BRAID A183H

## (undated) DEVICE — CANISTER, RIGID, 1200CC: Brand: MEDLINE INDUSTRIES, INC.